# Patient Record
Sex: FEMALE
[De-identification: names, ages, dates, MRNs, and addresses within clinical notes are randomized per-mention and may not be internally consistent; named-entity substitution may affect disease eponyms.]

---

## 2022-02-24 ENCOUNTER — EXTERNAL RECORD (OUTPATIENT)
Dept: HEALTH INFORMATION MANAGEMENT | Facility: OTHER | Age: 32
End: 2022-02-24

## 2022-06-21 ENCOUNTER — EXTERNAL RECORD (OUTPATIENT)
Dept: HEALTH INFORMATION MANAGEMENT | Facility: OTHER | Age: 32
End: 2022-06-21

## 2022-07-19 ENCOUNTER — OFFICE VISIT (OUTPATIENT)
Dept: ORTHOPEDICS | Age: 32
End: 2022-07-19

## 2022-07-19 DIAGNOSIS — G56.32: Primary | ICD-10-CM

## 2022-07-19 PROCEDURE — 99204 OFFICE O/P NEW MOD 45 MIN: CPT | Performed by: ORTHOPAEDIC SURGERY

## 2025-03-05 ENCOUNTER — HOSPITAL ENCOUNTER (EMERGENCY)
Facility: HOSPITAL | Age: 35
Discharge: HOME | End: 2025-03-05
Attending: EMERGENCY MEDICINE

## 2025-03-05 VITALS
DIASTOLIC BLOOD PRESSURE: 78 MMHG | HEART RATE: 91 BPM | OXYGEN SATURATION: 97 % | TEMPERATURE: 98 F | SYSTOLIC BLOOD PRESSURE: 131 MMHG | BODY MASS INDEX: 32.49 KG/M2 | HEIGHT: 65 IN | WEIGHT: 195 LBS | RESPIRATION RATE: 18 BRPM

## 2025-03-05 DIAGNOSIS — B96.89 ACUTE BACTERIAL PHARYNGITIS: Primary | ICD-10-CM

## 2025-03-05 DIAGNOSIS — J02.8 ACUTE BACTERIAL PHARYNGITIS: Primary | ICD-10-CM

## 2025-03-05 PROCEDURE — 99283 EMERGENCY DEPT VISIT LOW MDM: CPT

## 2025-03-05 RX ORDER — AMOXICILLIN 500 MG/1
500 TABLET, FILM COATED ORAL 2 TIMES DAILY
Qty: 20 TABLET | Refills: 0 | Status: SHIPPED | OUTPATIENT
Start: 2025-03-05 | End: 2025-03-15

## 2025-03-05 RX ORDER — AMOXICILLIN 875 MG/1
875 TABLET, COATED ORAL ONCE
Status: DISCONTINUED | OUTPATIENT
Start: 2025-03-05 | End: 2025-03-05

## 2025-03-05 RX ORDER — AMOXICILLIN 500 MG/1
500 CAPSULE ORAL ONCE
Status: COMPLETED | OUTPATIENT
Start: 2025-03-05 | End: 2025-03-05

## 2025-03-06 NOTE — ED PROVIDER NOTES
[facility]  Emergency Department Attending Note     Chief Complaint:   Chief Complaint   Patient presents with    Sore Throat     HISTORY OF PRESENT ILLNESS:   Romi Velarde is a 34 year old female who presents to the ED without significant past medical history presents with complaint of sore throat over the last 3 days.  States she has some fullness in her ears.  Some pain with swallowing.  No difficulty swallowing.  No voice change.  No neck stiffness.  No cough rhinorrhea congestion.  No fever chills.     MEDICAL & SOCIAL HISTORY:   History reviewed. No pertinent past medical history. History reviewed. No pertinent surgical history.   Social History     Socioeconomic History    Marital status: Single   Tobacco Use    Smoking status: Former     Types: Cigarettes     Passive exposure: Never    Smokeless tobacco: Never   Vaping Use    Vaping status: Former   Substance and Sexual Activity    Alcohol use: Yes     Comment: socially    Drug use: Yes     Frequency: 7.0 times per week     Types: Cannabis     Social Drivers of Health     Food Insecurity: Not on File (2024)    Received from [facility]    Food Insecurity     Food: 0   Transportation Needs: Not on File (2023)    Received from [facility]    Transportation Needs     Transportation: 0   Housing Stability: Not on File (2023)    Received from [facility]    Housing Stability     Housin    Allergies[1]   Current Outpatient Medications   Medication Sig Dispense Refill    amoxicillin 500 MG Oral Tab Take 1 tablet (500 mg total) by mouth 2 (two) times daily for 10 days. 20 tablet 0          REVIEW OF SYSTEMS   A 10 point review of systems was completed and is negative except as listed in history of present illness      PHYSICAL EXAM   Vitals: /78   Pulse 91   Temp 98.3 °F (36.8 °C) (Oral)   Resp 18   Ht 165.1 cm (5' 5\")   Wt 88.5 kg   LMP 2025 (Exact Date)   SpO2 97%   BMI 32.45 kg/m²   /78   Pulse 91   Temp 98.3 °F (36.8 °C)  (Oral)   Resp 18   Ht 165.1 cm (5' 5\")   Wt 88.5 kg   LMP 02/21/2025 (Exact Date)   SpO2 97%   BMI 32.45 kg/m²     General: A&Ox3, NAD  Constitutional: Well developed, well nourished, nontoxic  Head: atraumatic, normocephalic   Eyes: conjuctiva clear, no icterus, PERRL, EOMI, vision grossly normal  Ears: normal external appearance, no drainage  Nose:  Atraumatic, no swelling, no drainage, nares patent  Throat:  Moist pink mucous membranes, airway is patent erythema noted to posterior pharynx with some bilateral exudate no trismus uvular deviation clear phonation no pain within ablation of the hyoid no submandibular swelling or lingual elevation, no submental swelling, some bilateral cervical adenopathy  Neck:  Soft supple, no masses, no tracheal deviation, no stridor  Chest:  No bruising or abrasions, no tenderness, no deformity  Cardiac:  Regular rate and rhythm  Lung:  No distress, no retractions. Clear to auscultation bilaterally, no w/r/r  Abdomen:  Soft, nontender, nondistended, normal BS  Back: No stepoff/deformity  Extremities: FROM all ext, no deformities, intact equal peripheral pulses, no cyanosis or edema  Neuro: No facial droop, no slurred speech, moving all extremities freely, SILT to the bilateral upper and lower extremities  Psych: A&Ox3, normal affect, cooperative, calm  Skin: No rash, no petechiae/purpura, warm, dry      RESULTS  LABS: No results found for this or any previous visit.      IMAGING: No results found.        Procedures:   Procedures     ED COURSE          Re-Evaluation: Improved      Disposition & Plan:   Clinical Impression/Final Diagnosis:   1. Acute bacterial pharyngitis        Medical Decision Making: Exam history most consistent with acute bacterial pharyngitis will treat with amoxicillin discharge home instructed about very closely primary doctor in the next day or 2 and to return immediately to the present symptoms any concerns.  The patient verbalized understanding and  agreed with this plan.  No signs of any airway compromise impending airway demise    Medical Decision Making  Amount and/or Complexity of Data Reviewed  External Data Reviewed: notes.    Risk  OTC drugs.  Prescription drug management.  Decision regarding hospitalization.  Risk Details: Broad differential considered including but not limited to pharyngitis, deep space infection, airway compromise        *Please note that in the presenting to the emergency department, illness/injury that poses a threat to life or function is considered during this patient's initial evaluation.    The complexity of this visit is therefore inherently more complex given the need to consider life threatening pathology prior to any other etiology for this patient's visit.    The medical decision above exemplifies this rationale.     Disposition: Discharge  There are no disposition comments on file for this visit.       MDM          Disposition and Plan     Clinical Impression:  1. Acute bacterial pharyngitis         Disposition:  Discharge  3/5/2025  8:42 pm    Follow-up:  Soheila Block MD  [address] [place] [id number]  [phone]    Schedule an appointment as soon as possible for a visit in 2 day(s)            Medications Prescribed:  Current Discharge Medication List        START taking these medications    Details   amoxicillin 500 MG Oral Tab Take 1 tablet (500 mg total) by mouth 2 (two) times daily for 10 days.  Qty: 20 tablet, Refills: 0                 Supplementary Documentation:                                                     This note was generated in part using voice recognition dictation technology. The report was reviewed by this physician but still may have unintentional errors due to inherent limitations of voice recognition technology. All times are estimates.         [1] No Known Allergies